# Patient Record
Sex: FEMALE | Race: WHITE | URBAN - METROPOLITAN AREA
[De-identification: names, ages, dates, MRNs, and addresses within clinical notes are randomized per-mention and may not be internally consistent; named-entity substitution may affect disease eponyms.]

---

## 2021-12-21 ENCOUNTER — OFFICE VISIT (OUTPATIENT)
Dept: URBAN - METROPOLITAN AREA CLINIC 82 | Facility: CLINIC | Age: 21
End: 2021-12-21
Payer: COMMERCIAL

## 2021-12-21 ENCOUNTER — LAB OUTSIDE AN ENCOUNTER (OUTPATIENT)
Dept: URBAN - METROPOLITAN AREA CLINIC 82 | Facility: CLINIC | Age: 21
End: 2021-12-21

## 2021-12-21 ENCOUNTER — WEB ENCOUNTER (OUTPATIENT)
Dept: URBAN - METROPOLITAN AREA CLINIC 82 | Facility: CLINIC | Age: 21
End: 2021-12-21

## 2021-12-21 DIAGNOSIS — R10.13 EPIGASTRIC PAIN: ICD-10-CM

## 2021-12-21 PROCEDURE — 1036F TOBACCO NON-USER: CPT | Performed by: INTERNAL MEDICINE

## 2021-12-21 PROCEDURE — 99244 OFF/OP CNSLTJ NEW/EST MOD 40: CPT | Performed by: INTERNAL MEDICINE

## 2021-12-21 PROCEDURE — G8427 DOCREV CUR MEDS BY ELIG CLIN: HCPCS | Performed by: INTERNAL MEDICINE

## 2021-12-21 PROCEDURE — 99204 OFFICE O/P NEW MOD 45 MIN: CPT | Performed by: INTERNAL MEDICINE

## 2021-12-21 PROCEDURE — G8420 CALC BMI NORM PARAMETERS: HCPCS | Performed by: INTERNAL MEDICINE

## 2021-12-21 PROCEDURE — G9903 PT SCRN TBCO ID AS NON USER: HCPCS | Performed by: INTERNAL MEDICINE

## 2021-12-21 NOTE — HPI-TODAY'S VISIT:
12/21/2021 Patient is a 21 year old White female who presents on referral from Kika Velarde PA-C for evaluation of epigastric pain. A copy of the note will be sent to the referring provider. Patient c/o intermittent epigastric pain which has been present for past 3-4 days. Epigastric pain is sometimes associated with nausea. Pepcid and Prilosec have improved her symptoms. She denies any history of chronic medical conditions. No known history of thyroid conditions. She denies use of NSAIDs, natural supplements, or herbal medications. She takes Tylenol occasionally. Patient has history of anxiety but she denies experiencing any recent stress.

## 2021-12-22 ENCOUNTER — LAB OUTSIDE AN ENCOUNTER (OUTPATIENT)
Dept: URBAN - METROPOLITAN AREA SURGERY CENTER 13 | Facility: SURGERY CENTER | Age: 21
End: 2021-12-22

## 2021-12-22 ENCOUNTER — TELEPHONE ENCOUNTER (OUTPATIENT)
Dept: URBAN - METROPOLITAN AREA CLINIC 92 | Facility: CLINIC | Age: 21
End: 2021-12-22

## 2021-12-22 ENCOUNTER — OFFICE VISIT (OUTPATIENT)
Dept: URBAN - METROPOLITAN AREA SURGERY CENTER 13 | Facility: SURGERY CENTER | Age: 21
End: 2021-12-22
Payer: COMMERCIAL

## 2021-12-22 DIAGNOSIS — K22.89 ESOPHAGEAL BLEEDING: ICD-10-CM

## 2021-12-22 DIAGNOSIS — R10.13 ABDOMINAL DISCOMFORT, EPIGASTRIC: ICD-10-CM

## 2021-12-22 DIAGNOSIS — K29.60 ADENOPAPILLOMATOSIS GASTRICA: ICD-10-CM

## 2021-12-22 DIAGNOSIS — K31.89 ACQUIRED DEFORMITY OF DUODENUM: ICD-10-CM

## 2021-12-22 PROCEDURE — G8907 PT DOC NO EVENTS ON DISCHARG: HCPCS | Performed by: INTERNAL MEDICINE

## 2021-12-22 PROCEDURE — 43239 EGD BIOPSY SINGLE/MULTIPLE: CPT | Performed by: INTERNAL MEDICINE

## 2021-12-29 ENCOUNTER — TELEPHONE ENCOUNTER (OUTPATIENT)
Dept: URBAN - METROPOLITAN AREA CLINIC 82 | Facility: CLINIC | Age: 21
End: 2021-12-29

## 2022-01-20 ENCOUNTER — TELEPHONE ENCOUNTER (OUTPATIENT)
Dept: URBAN - METROPOLITAN AREA CLINIC 82 | Facility: CLINIC | Age: 22
End: 2022-01-20

## 2022-01-26 ENCOUNTER — TELEPHONE ENCOUNTER (OUTPATIENT)
Dept: URBAN - METROPOLITAN AREA CLINIC 82 | Facility: CLINIC | Age: 22
End: 2022-01-26

## 2022-02-08 ENCOUNTER — OFFICE VISIT (OUTPATIENT)
Dept: URBAN - METROPOLITAN AREA CLINIC 82 | Facility: CLINIC | Age: 22
End: 2022-02-08
Payer: COMMERCIAL

## 2022-02-08 VITALS
TEMPERATURE: 98.3 F | HEIGHT: 63 IN | BODY MASS INDEX: 24.1 KG/M2 | SYSTOLIC BLOOD PRESSURE: 119 MMHG | WEIGHT: 136 LBS | HEART RATE: 97 BPM | DIASTOLIC BLOOD PRESSURE: 77 MMHG

## 2022-02-08 DIAGNOSIS — R10.9 ABDOMINAL PAIN: ICD-10-CM

## 2022-02-08 DIAGNOSIS — R10.13 DYSPEPSIA: ICD-10-CM

## 2022-02-08 PROCEDURE — G8420 CALC BMI NORM PARAMETERS: HCPCS | Performed by: INTERNAL MEDICINE

## 2022-02-08 PROCEDURE — G8427 DOCREV CUR MEDS BY ELIG CLIN: HCPCS | Performed by: INTERNAL MEDICINE

## 2022-02-08 PROCEDURE — G9903 PT SCRN TBCO ID AS NON USER: HCPCS | Performed by: INTERNAL MEDICINE

## 2022-02-08 PROCEDURE — 99214 OFFICE O/P EST MOD 30 MIN: CPT | Performed by: INTERNAL MEDICINE

## 2022-02-08 RX ORDER — OMEPRAZOLE 40 MG/1
1 CAPSULE 30 MINUTES BEFORE MORNING MEAL CAPSULE, DELAYED RELEASE ORAL ONCE A DAY
Qty: 90 | Refills: 1 | OUTPATIENT
Start: 2022-02-08

## 2022-02-08 RX ORDER — SUCRALFATE 1 G/1
1 TABLET ON AN EMPTY STOMACH TABLET ORAL TWICE A DAY
Qty: 60 TABLET | Refills: 1 | OUTPATIENT
Start: 2022-02-08 | End: 2022-04-09

## 2022-02-08 NOTE — HPI-TODAY'S VISIT:
12/21/2021 Patient is a 21 year old White female who presents on referral from Kika Velarde PA-C for evaluation of epigastric pain. A copy of the note will be sent to the referring provider. Patient c/o intermittent epigastric pain which has been present for past 3-4 days. Epigastric pain is sometimes associated with nausea. Pepcid and Prilosec have improved her symptoms. She denies any history of chronic medical conditions. No known history of thyroid conditions. She denies use of NSAIDs, natural supplements, or herbal medications. She takes Tylenol occasionally. Patient has history of anxiety but she denies experiencing any recent stress.   02/08/2022 Patient presents for follow up office visit. EGD on 12/22/2021 showed gastritis and esophagitis. Biopsy showed gastritis, no H pylori. HIDA scan on 12/22/2021 was normal. Patient reports she still has occasional epigastric pain and heartburn but improved compared to before. She also admits frequent bloating and belching. Denies any constipation or diarrhea. She has been taking Prilosec OTC once a day. No known family history of esophageal or stomach conditions. Patient has history of anxiety, but she states this is mostly under control.

## 2022-05-17 ENCOUNTER — LAB OUTSIDE AN ENCOUNTER (OUTPATIENT)
Dept: URBAN - METROPOLITAN AREA CLINIC 82 | Facility: CLINIC | Age: 22
End: 2022-05-17

## 2022-05-17 ENCOUNTER — OFFICE VISIT (OUTPATIENT)
Dept: URBAN - METROPOLITAN AREA CLINIC 82 | Facility: CLINIC | Age: 22
End: 2022-05-17
Payer: COMMERCIAL

## 2022-05-17 ENCOUNTER — DASHBOARD ENCOUNTERS (OUTPATIENT)
Age: 22
End: 2022-05-17

## 2022-05-17 VITALS
HEART RATE: 86 BPM | DIASTOLIC BLOOD PRESSURE: 76 MMHG | SYSTOLIC BLOOD PRESSURE: 117 MMHG | TEMPERATURE: 98 F | BODY MASS INDEX: 25.02 KG/M2 | HEIGHT: 63 IN | WEIGHT: 141.2 LBS

## 2022-05-17 DIAGNOSIS — K21.9 GERD (GASTROESOPHAGEAL REFLUX DISEASE): ICD-10-CM

## 2022-05-17 DIAGNOSIS — R14.0 BLOATING: ICD-10-CM

## 2022-05-17 DIAGNOSIS — R13.10 DYSPHAGIA: ICD-10-CM

## 2022-05-17 DIAGNOSIS — R10.13 DYSPEPSIA: ICD-10-CM

## 2022-05-17 DIAGNOSIS — R10.9 ABDOMINAL PAIN: ICD-10-CM

## 2022-05-17 PROBLEM — 162031009: Status: ACTIVE | Noted: 2022-02-08

## 2022-05-17 PROBLEM — 21522001 ABDOMINAL PAIN: Status: ACTIVE | Noted: 2022-02-08

## 2022-05-17 PROBLEM — 235595009 GASTROESOPHAGEAL REFLUX DISEASE: Status: ACTIVE | Noted: 2022-05-17

## 2022-05-17 PROBLEM — 60728008 BLOATING: Status: ACTIVE | Noted: 2022-05-17

## 2022-05-17 PROCEDURE — G8420 CALC BMI NORM PARAMETERS: HCPCS | Performed by: INTERNAL MEDICINE

## 2022-05-17 PROCEDURE — G9903 PT SCRN TBCO ID AS NON USER: HCPCS | Performed by: INTERNAL MEDICINE

## 2022-05-17 PROCEDURE — G8427 DOCREV CUR MEDS BY ELIG CLIN: HCPCS | Performed by: INTERNAL MEDICINE

## 2022-05-17 PROCEDURE — 99214 OFFICE O/P EST MOD 30 MIN: CPT | Performed by: INTERNAL MEDICINE

## 2022-05-17 RX ORDER — FAMOTIDINE 40 MG/1
1 TABLET TABLET, FILM COATED ORAL TWICE A DAY
Qty: 180 TABLET | Refills: 1 | OUTPATIENT
Start: 2022-05-17

## 2022-05-17 RX ORDER — OMEPRAZOLE 40 MG/1
1 CAPSULE 30 MINUTES BEFORE MORNING MEAL CAPSULE, DELAYED RELEASE ORAL ONCE A DAY
Qty: 90 | Refills: 1 | Status: ACTIVE | COMMUNITY
Start: 2022-02-08

## 2022-05-17 NOTE — HPI-TODAY'S VISIT:
12/21/2021 Patient is a 21 year old White female who presents on referral from Kika Velarde PA-C for evaluation of epigastric pain. A copy of the note will be sent to the referring provider. Patient c/o intermittent epigastric pain which has been present for past 3-4 days. Epigastric pain is sometimes associated with nausea. Pepcid and Prilosec have improved her symptoms. She denies any history of chronic medical conditions. No known history of thyroid conditions. She denies use of NSAIDs, natural supplements, or herbal medications. She takes Tylenol occasionally. Patient has history of anxiety but she denies experiencing any recent stress.   02/08/2022 Patient presents for follow up office visit. EGD on 12/22/2021 showed gastritis and esophagitis. Biopsy showed gastritis, no H pylori. HIDA scan on 12/22/2021 was normal. Patient reports she still has occasional epigastric pain and heartburn but improved compared to before. She also admits frequent bloating and belching. Denies any constipation or diarrhea. She has been taking Prilosec OTC once a day. No known family history of esophageal or stomach conditions. Patient has history of anxiety, but she states this is mostly under control.  5/17/2022 EGD on 12/22/2021 showed gastritis and esophagitis. Biopsy showed gastritis, no H pylori. HIDA scan on 12/22/2021 was normal. Patient reports she is doing well. Heartburn symptoms present occasionally, about 2-3 times a week. Symptoms improved on Prilosec OTC as needed. She does report side effects of headaches from Prilosec. She is making dietary changes to avoid triggers for heartburn. Patient reports one episode of pain in lower esophagus that radiated to her chest. This occured about 1 month ago and lasted 1 week. Patient also admits intermittent bloating. Denies any constipation, diarrhea, blood in the stool, joint pain. Patient's brother has history of ulcerative colitis, diagnosed at 24 years old.

## 2022-06-16 PROBLEM — 40739000 DYSPHAGIA: Status: ACTIVE | Noted: 2022-05-17

## 2022-07-06 ENCOUNTER — CLAIMS CREATED FROM THE CLAIM WINDOW (OUTPATIENT)
Dept: URBAN - METROPOLITAN AREA CLINIC 4 | Facility: CLINIC | Age: 22
End: 2022-07-06
Payer: COMMERCIAL

## 2022-07-06 ENCOUNTER — OFFICE VISIT (OUTPATIENT)
Dept: URBAN - METROPOLITAN AREA SURGERY CENTER 13 | Facility: SURGERY CENTER | Age: 22
End: 2022-07-06
Payer: COMMERCIAL

## 2022-07-06 DIAGNOSIS — R10.13 ABDOMINAL DISCOMFORT, EPIGASTRIC: ICD-10-CM

## 2022-07-06 DIAGNOSIS — K31.89 OTHER DISEASES OF STOMACH AND DUODENUM: ICD-10-CM

## 2022-07-06 DIAGNOSIS — K21.9 GASTRO-ESOPHAGEAL REFLUX DISEASE WITHOUT ESOPHAGITIS: ICD-10-CM

## 2022-07-06 DIAGNOSIS — R13.19 CERVICAL DYSPHAGIA: ICD-10-CM

## 2022-07-06 DIAGNOSIS — K31.89 ACQUIRED DEFORMITY OF DUODENUM: ICD-10-CM

## 2022-07-06 DIAGNOSIS — K21.9 ACID REFLUX: ICD-10-CM

## 2022-07-06 PROCEDURE — 88305 TISSUE EXAM BY PATHOLOGIST: CPT | Performed by: PATHOLOGY

## 2022-07-06 PROCEDURE — 43239 EGD BIOPSY SINGLE/MULTIPLE: CPT | Performed by: INTERNAL MEDICINE

## 2022-07-06 PROCEDURE — G8907 PT DOC NO EVENTS ON DISCHARG: HCPCS | Performed by: INTERNAL MEDICINE

## 2022-07-06 PROCEDURE — 88312 SPECIAL STAINS GROUP 1: CPT | Performed by: PATHOLOGY
